# Patient Record
(demographics unavailable — no encounter records)

---

## 2025-01-02 NOTE — DISCUSSION/SUMMARY
[FreeTextEntry1] : 16 year girl is here today for immunization update. patient is doing well. Vaccine listed discussed and given. VIS given. Possible side effects discussed. Place PPD left forearm

## 2025-01-02 NOTE — HISTORY OF PRESENT ILLNESS
[PPD] : PPD [FreeTextEntry1] : 16 year girl is here today for immunization update. patient is doing well. Vaccine listed discussed and given. VIS given. Possible side effects discussed. Place PPD left forearm

## 2025-05-28 NOTE — PHYSICAL EXAM
[Tired appearing] : tired appearing [Inflamed Nasal Mucosa] : inflamed nasal mucosa [Erythematous Oropharynx] : erythematous oropharynx [NL] : warm, clear [Conjuctival Injection] : no conjunctival injection [Enlarged Tonsils] : tonsils not enlarged [de-identified] : clear PND

## 2025-05-28 NOTE — DISCUSSION/SUMMARY
[FreeTextEntry1] :  on viral illness- fever, stuffy nose and sore throat throat cx sent out  supportive care- otc meds rest  questions aobut taking digestive enzyme- holistic based for IBS symptoms-  takes medication prescribed by DR Au 1.5 years ago-  symptoms have worsen past few months- recommend to call him to ask about the enzeme but then state havent seen in over 1 year- looked at ingredients on patient phone- can try and see BUT highly recommend returning to Dr Cruz

## 2025-06-18 NOTE — DISCUSSION/SUMMARY
[Normal Growth] : growth [Normal Development] : development  [No Elimination Concerns] : elimination [Continue Regimen] : feeding [No Skin Concerns] : skin [Normal Sleep Pattern] : sleep [None] : no medical problems [Anticipatory Guidance Given] : Anticipatory guidance addressed as per the history of present illness section [Physical Growth and Development] : physical growth and development [Social and Academic Competence] : social and academic competence [Emotional Well-Being] : emotional well-being [Risk Reduction] : risk reduction [Violence and Injury Prevention] : violence and injury prevention [No Vaccines] : no vaccines needed [No Medications] : ~He/She~ is not on any medications [Patient] : patient [Parent/Guardian] : Parent/Guardian [FreeTextEntry1] : THis is a adolescent patient here for routine exam .I  have recommended that the patient participates in 60 minutes or more of physical activity a day.   I explained that it is important to limit screen time to less than 2 hrs a day. Patients diet was discussed and advice given on how to maintain good healthy caloric intake . Physical exam is within normal limits . Immunizations were discussed patient received Menquadfi.  Patient to follow up in 1 year for routine exam  Craft Screen- not applicable until 14 year old    PASSED

## 2025-06-18 NOTE — PHYSICAL EXAM

## 2025-06-18 NOTE — HISTORY OF PRESENT ILLNESS
[Mother] : mother [Yes] : Patient goes to dentist yearly [Normal] : normal [Cycle Length: _____ days] : Cycle Length: [unfilled] days [Days of Bleeding: _____] : Days of bleeding: [unfilled] [Eats meals with family] : eats meals with family [Grade: ____] : Grade: [unfilled] [Normal Performance] : normal performance [Normal Behavior/Attention] : normal behavior/attention [Normal Homework] : normal homework [Eats regular meals including adequate fruits and vegetables] : eats regular meals including adequate fruits and vegetables [Has friends] : has friends [At least 1 hour of physical activity a day] : at least 1 hour of physical activity a day [Screen time (except homework) less than 2 hours a day] : screen time (except homework) less than 2 hours a day [Drinks alcohol] : drinks alcohol [No] : Patient has not had sexual intercourse. [Has ways to cope with stress] : has ways to cope with stress [Displays self-confidence] : displays self-confidence [NO] : No [Sleep Concerns] : no sleep concerns [Uses electronic nicotine delivery system] : does not use electronic nicotine delivery system [Uses tobacco] : does not use tobacco [Uses drugs] : does not use drugs  [Uses safety belts/safety equipment] : uses safety belts/safety equipment  [Has problems with sleep] : does not have problems with sleep [Gets depressed, anxious, or irritable/has mood swings] : does not get depressed, anxious, or irritable/has mood swings [Has thought about hurting self or considered suicide] : has not thought about hurting self or considered suicide [de-identified] : good student  [de-identified] : swims ,  [employer]  [de-identified] : social  [FreeTextEntry1] : This is a 16 year F here for routine exam and immunizations . parents deny any recent illnesses or ER visits